# Patient Record
Sex: FEMALE | Race: OTHER | HISPANIC OR LATINO | URBAN - METROPOLITAN AREA
[De-identification: names, ages, dates, MRNs, and addresses within clinical notes are randomized per-mention and may not be internally consistent; named-entity substitution may affect disease eponyms.]

---

## 2018-04-27 ENCOUNTER — OUTPATIENT (OUTPATIENT)
Dept: OUTPATIENT SERVICES | Facility: HOSPITAL | Age: 73
LOS: 1 days | Discharge: HOME | End: 2018-04-27
Payer: MEDICARE

## 2018-04-27 PROCEDURE — G0121: CPT

## 2018-04-27 PROCEDURE — 88305 TISSUE EXAM BY PATHOLOGIST: CPT

## 2018-04-27 PROCEDURE — 43239 EGD BIOPSY SINGLE/MULTIPLE: CPT

## 2018-04-30 LAB — SURGICAL PATHOLOGY STUDY: SIGNIFICANT CHANGE UP

## 2020-09-14 ENCOUNTER — HOSPITAL ENCOUNTER (EMERGENCY)
Facility: HOSPITAL | Age: 75
Discharge: HOME | End: 2020-09-14
Attending: EMERGENCY MEDICINE | Admitting: EMERGENCY MEDICINE
Payer: MEDICAID

## 2020-09-14 ENCOUNTER — APPOINTMENT (EMERGENCY)
Dept: RADIOLOGY | Facility: HOSPITAL | Age: 75
End: 2020-09-14
Payer: MEDICAID

## 2020-09-14 VITALS
HEIGHT: 61 IN | BODY MASS INDEX: 24.92 KG/M2 | OXYGEN SATURATION: 92 % | SYSTOLIC BLOOD PRESSURE: 156 MMHG | TEMPERATURE: 98.4 F | RESPIRATION RATE: 18 BRPM | HEART RATE: 59 BPM | WEIGHT: 132 LBS | DIASTOLIC BLOOD PRESSURE: 80 MMHG

## 2020-09-14 DIAGNOSIS — M79.605 LEFT LEG PAIN: Primary | ICD-10-CM

## 2020-09-14 PROCEDURE — NC001 PR NO CHARGE: Performed by: EMERGENCY MEDICINE

## 2020-09-14 PROCEDURE — 99283 EMERGENCY DEPT VISIT LOW MDM: CPT

## 2020-09-14 PROCEDURE — 72170 X-RAY EXAM OF PELVIS: CPT

## 2020-09-14 PROCEDURE — 99284 EMERGENCY DEPT VISIT MOD MDM: CPT | Performed by: EMERGENCY MEDICINE

## 2020-09-14 PROCEDURE — 72100 X-RAY EXAM L-S SPINE 2/3 VWS: CPT

## 2020-09-14 RX ORDER — METOPROLOL SUCCINATE 50 MG/1
50 TABLET, EXTENDED RELEASE ORAL DAILY
COMMUNITY

## 2020-09-14 RX ORDER — LEVOTHYROXINE SODIUM 100 UG/1
0.88 CAPSULE ORAL
COMMUNITY

## 2020-09-14 RX ORDER — AMLODIPINE BESYLATE 10 MG/1
10 TABLET ORAL DAILY
COMMUNITY

## 2020-09-14 RX ORDER — METHYLPREDNISOLONE 4 MG/1
TABLET ORAL
Qty: 21 TABLET | Refills: 0 | Status: SHIPPED | OUTPATIENT
Start: 2020-09-14

## 2020-09-14 RX ORDER — ATORVASTATIN CALCIUM 10 MG/1
10 TABLET, FILM COATED ORAL DAILY
COMMUNITY

## 2020-09-14 NOTE — ED PROVIDER NOTES
History  Chief Complaint   Patient presents with    Leg Pain     Pt presents to ER from home for L leg pain x2 weeks and worse with ambulation or palpation  Denies initial injury  Pain is posterior thigh, lateral leg near knee and calf  HPI    Prior to Admission Medications   Prescriptions Last Dose Informant Patient Reported? Taking? Levothyroxine Sodium 100 MCG CAPS 9/14/2020 at Unknown time Child Yes Yes   Sig: Take 0 88 mcg by mouth   amLODIPine (NORVASC) 10 mg tablet 9/14/2020 at Unknown time Child Yes Yes   Sig: Take 10 mg by mouth daily   atorvastatin (LIPITOR) 10 mg tablet 9/13/2020 at Unknown time Child Yes Yes   Sig: Take 10 mg by mouth daily   metoprolol succinate (TOPROL-XL) 50 mg 24 hr tablet 9/14/2020 at Unknown time Self Yes Yes   Sig: Take 50 mg by mouth daily      Facility-Administered Medications: None       Past Medical History:   Diagnosis Date    Disease of thyroid gland     hypo    Hypertension        History reviewed  No pertinent surgical history  History reviewed  No pertinent family history  I have reviewed and agree with the history as documented      E-Cigarette/Vaping     E-Cigarette/Vaping Substances     Social History     Tobacco Use    Smoking status: Never Smoker    Smokeless tobacco: Never Used   Substance Use Topics    Alcohol use: Never     Frequency: Never    Drug use: Never       Review of Systems    Physical Exam  Physical Exam    Vital Signs  ED Triage Vitals [09/14/20 1756]   Temperature Pulse Respirations Blood Pressure SpO2   98 4 °F (36 9 °C) 66 18 (!) 191/91 94 %      Temp Source Heart Rate Source Patient Position - Orthostatic VS BP Location FiO2 (%)   Oral Monitor Sitting Left arm --      Pain Score       9           Vitals:    09/14/20 1756 09/14/20 2027   BP: (!) 191/91 156/80   Pulse: 66 59   Patient Position - Orthostatic VS: Sitting Lying         Visual Acuity  Visual Acuity      Most Recent Value   L Pupil Size (mm)  3   R Pupil Size (mm)  3 ED Medications  Medications - No data to display    Diagnostic Studies  Results Reviewed     None                 XR pelvis ap only 1 or 2 views   Final Result by Odalis Jackson MD (09/14 2051)      No acute osseous abnormality  Moderate bilateral hip osteoarthritis  Workstation performed: [id number]         XR lumbar spine 2 or 3 views   Final Result by Odalis Jackson MD (09/14 2049)      No acute osseous abnormality  Degenerative changes as described  Workstation performed: [id number]                    Procedures  Procedures         ED Course                           SBIRT 22yo+      Most Recent Value   SBIRT (24 yo +)   In order to provide better care to our patients, we are screening all of our patients for alcohol and drug use  Would it be okay to ask you these screening questions? Yes Filed at: 09/14/2020 1908   Initial Alcohol Screen: US AUDIT-C    1  How often do you have a drink containing alcohol?  0 Filed at: 09/14/2020 1908   2  How many drinks containing alcohol do you have on a typical day you are drinking? 0 Filed at: 09/14/2020 1908   3a  Male UNDER 65: How often do you have five or more drinks on one occasion? 0 Filed at: 09/14/2020 1908   3b  FEMALE Any Age, or MALE 65+: How often do you have 4 or more drinks on one occassion? 0 Filed at: 09/14/2020 1908   Audit-C Score  0 Filed at: 09/14/2020 1908   AMINA: How many times in the past year have you    Used an illegal drug or used a prescription medication for non-medical reasons?   Never Filed at: 09/14/2020 1908                  MDM    Disposition  Final diagnoses:   Left leg pain     Time reflects when diagnosis was documented in both MDM as applicable and the Disposition within this note     Time User Action Codes Description Comment    9/14/2020  8:06 PM Alec Rios Add [M79 605] Left leg pain       ED Disposition     ED Disposition Condition Date/Time Comment    Discharge Stable Mon Sep 14, 2020  8:06 PM [place] discharge to home/self care  Follow-up Information     Follow up With Specialties Details Why Contact Info Additional Information    Physical Therapy at [facility] AND [facility] Physical Therapy Schedule an appointment as soon as possible for a visit in 1 week If symptoms worsen return to the emergency department  [address] [facility] [address] [facility] [address]  [facility] PT, [address] [facility],  [address] [facility], [place], [place], [address] [facility]          Discharge Medication List as of 9/14/2020  8:09 PM      CONTINUE these medications which have NOT CHANGED    Details   amLODIPine (NORVASC) 10 mg tablet Take 10 mg by mouth daily, Historical Med      atorvastatin (LIPITOR) 10 mg tablet Take 10 mg by mouth daily, Historical Med      Levothyroxine Sodium 100 MCG CAPS Take 0 88 mcg by mouth, Historical Med      metoprolol succinate (TOPROL-XL) 50 mg 24 hr tablet Take 50 mg by mouth daily, Historical Med           No discharge procedures on file      PDMP Review     None          ED Provider  Electronically Signed by           Donte Vragas MD  09/15/20 Chela Estrada MD  09/15/20 [id number]

## 2020-09-14 NOTE — ED PROVIDER NOTES
History  Chief Complaint   Patient presents with    Leg Pain     Pt presents to ER from home for L leg pain x2 weeks and worse with ambulation or palpation  Denies initial injury  Pain is posterior thigh, lateral leg near knee and calf  This patient is a 68-year-old female who presents to the ED this evening with chief complaints of left lower extremity pain  When asked to indicate, the patient points from her hip all the way down to her toes  The patient is accompanied by her daughter, who helped translate as the patient speaks fluent [place] and limited [place]  According to the patient and the daughter the symptoms began 2 and half months ago and progressively have worsened  These last couple days they have gotten to the point that the she is having neuralgic like pains of the entire left lower extremity which are waking her at night  She is having difficulty walking etc   She denies any increase in symptoms with coughing sneezing or straining at stool  She denies any concurrent illness, shortness of breath, or chest pain  She denies any abdominal pain, constipation, or diarrhea  She denies nausea or vomiting  There is no mechanical pattern to her symptoms except day intensify towards the end of the day  Past medical history is most significant for lymphoma approximately 15 years ago  The patient is from [facility], and she is vacation with her daughter the past 3 months or so here and she will be in the [place] area for additional month or so  Comprehensive physical examination was performed at the bedside with the daughter present  None       Past Medical History:   Diagnosis Date    Disease of thyroid gland     hypo    Hypertension        History reviewed  No pertinent surgical history  History reviewed  No pertinent family history  I have reviewed and agree with the history as documented      E-Cigarette/Vaping     E-Cigarette/Vaping Substances     Social History     Tobacco Use    Smoking status: Never Smoker    Smokeless tobacco: Never Used   Substance Use Topics    Alcohol use: Never     Frequency: Never    Drug use: Never        Review of Systems   Constitutional: Negative for chills, fatigue and fever  Respiratory: Negative for cough and shortness of breath  Cardiovascular: Negative for chest pain and leg swelling  Gastrointestinal: Negative for abdominal pain, diarrhea, nausea and vomiting  Genitourinary: Negative for difficulty urinating and dysuria  Musculoskeletal: Negative for joint swelling  Skin: Negative  Physical Exam  ED Triage Vitals [09/14/20 1756]   Temperature Pulse Respirations Blood Pressure SpO2   98 4 °F (36 9 °C) 66 18 (!) 191/91 94 %      Temp Source Heart Rate Source Patient Position - Orthostatic VS BP Location FiO2 (%)   Oral Monitor Sitting Left arm --      Pain Score       9             Orthostatic Vital Signs  Vitals:    09/14/20 1756   BP: (!) 191/91   Pulse: 66   Patient Position - Orthostatic VS: Sitting       Physical Exam  HENT:      Head: Atraumatic  Eyes:      Extraocular Movements: Extraocular movements intact  Cardiovascular:      Rate and Rhythm: Normal rate  Heart sounds: No murmur  Pulmonary:      Effort: Pulmonary effort is normal       Breath sounds: Normal breath sounds  No wheezing  Abdominal:      General: Abdomen is flat  Bowel sounds are normal       Palpations: Abdomen is soft  Skin:     General: Skin is warm and dry  Neurological:      General: No focal deficit present  Mental Status: She is alert  Sensory: Sensory deficit (Left L5 dermatome) present  Motor: Weakness (left foot EHL and peroneal group grade +4/5) present  Deep Tendon Reflexes: Reflexes abnormal (2+ at the knees and symmetric absent at both ankles  )     Psychiatric:         Mood and Affect: Mood normal          Behavior: Behavior normal          ED Medications  Medications - No data to display    Diagnostic Studies  Results Reviewed     None                 No orders to display         Procedures  Procedures      ED Course                           SBIRT 22yo+      Most Recent Value   SBIRT (24 yo +)   In order to provide better care to our patients, we are screening all of our patients for alcohol and drug use  Would it be okay to ask you these screening questions? Yes Filed at: 09/14/2020 1908   Initial Alcohol Screen: US AUDIT-C    1  How often do you have a drink containing alcohol?  0 Filed at: 09/14/2020 1908   2  How many drinks containing alcohol do you have on a typical day you are drinking? 0 Filed at: 09/14/2020 1908   3a  Male UNDER 65: How often do you have five or more drinks on one occasion? 0 Filed at: 09/14/2020 1908   3b  FEMALE Any Age, or MALE 65+: How often do you have 4 or more drinks on one occassion? 0 Filed at: 09/14/2020 1908   Audit-C Score  0 Filed at: 09/14/2020 1908   AMINA: How many times in the past year have you    Used an illegal drug or used a prescription medication for non-medical reasons? Never Filed at: 09/14/2020 1908                  MDM      Disposition  Final diagnoses:   None     ED Disposition     None      Follow-up Information    None         Patient's Medications    No medications on file     No discharge procedures on file  PDMP Review     None           ED Provider  Attending physically available and evaluated Kalie Hernandez I managed the patient along with the ED Attending      Electronically Signed by         Jeane Rogel MD  09/14/20 2021

## 2020-09-15 NOTE — ED NOTES
Patients daughter asking if patient is able to drink water, informed her at this time d/t pending xray results we will hold off on providing PO beverages  Patient and daughter verbalized understanding  No distress noted at this time  Patient resting comfortably in bed       Brayden Camacho RN  09/14/20 2003

## 2020-09-15 NOTE — ED ATTENDING ATTESTATION
9/14/2020  Boo RUTHERFORD MD, saw and evaluated the patient  I have discussed the patient with the resident/non-physician practitioner and agree with the resident's/non-physician practitioner's findings, Plan of Care, and MDM as documented in the resident's/non-physician practitioner's note, except where noted  All available labs and Radiology studies were reviewed  I was present for key portions of any procedure(s) performed by the resident/non-physician practitioner and I was immediately available to provide assistance  At this point I agree with the current assessment done in the Emergency Department  I have conducted an independent evaluation of this patient a history and physical is as follows:    ED Course         Critical Care Time  Procedures    Patient seen independently and in conjunction with the medical resident  Examination and history consistent with possible disc herniation in the lumbar region with sciatica  X-ray of lumbar spine and pelvic bones grossly unremarkable  Will treat with a Medrol Dosepak and referred to physical therapy  If pain and symptoms linger patient may need lumbar MRI